# Patient Record
Sex: FEMALE | Race: WHITE | Employment: PART TIME | URBAN - METROPOLITAN AREA
[De-identification: names, ages, dates, MRNs, and addresses within clinical notes are randomized per-mention and may not be internally consistent; named-entity substitution may affect disease eponyms.]

---

## 2024-04-03 ENCOUNTER — APPOINTMENT (OUTPATIENT)
Dept: CT IMAGING | Facility: HOSPITAL | Age: 64
End: 2024-04-03
Attending: EMERGENCY MEDICINE
Payer: COMMERCIAL

## 2024-04-03 ENCOUNTER — HOSPITAL ENCOUNTER (EMERGENCY)
Facility: HOSPITAL | Age: 64
Discharge: HOME | End: 2024-04-03
Attending: EMERGENCY MEDICINE
Payer: COMMERCIAL

## 2024-04-03 VITALS
SYSTOLIC BLOOD PRESSURE: 152 MMHG | TEMPERATURE: 98 F | OXYGEN SATURATION: 94 % | HEIGHT: 59 IN | DIASTOLIC BLOOD PRESSURE: 85 MMHG | RESPIRATION RATE: 18 BRPM | WEIGHT: 127 LBS | BODY MASS INDEX: 25.6 KG/M2 | HEART RATE: 93 BPM

## 2024-04-03 DIAGNOSIS — K11.20 PAROTITIS: Primary | ICD-10-CM

## 2024-04-03 LAB — S PYO AG THROAT QL: NEGATIVE

## 2024-04-03 PROCEDURE — 99284 EMERGENCY DEPT VISIT MOD MDM: CPT

## 2024-04-03 PROCEDURE — 70490 CT SOFT TISSUE NECK W/O DYE: CPT | Performed by: EMERGENCY MEDICINE

## 2024-04-03 PROCEDURE — 87880 STREP A ASSAY W/OPTIC: CPT

## 2024-04-04 NOTE — ED INITIAL ASSESSMENT (HPI)
Pt c/o sore throat x 1.5 months. Pt reports taking ampicillin and it hasn't helped, and I am still having pain.     Pt had labs done and showed mumps antibody read high at 0.94. Pt scheduled to have a US soft tissue neck for 4/9/24

## 2024-04-04 NOTE — ED QUICK NOTES
Patient safe to DC home per MD. Able to dress self. DC teaching done, instructions reviewed with patient including when and how to follow up with healthcare providers and when to seek emergency care. The patient verbalizes understanding. Patient ambulatory with steady gait to exit.

## 2024-04-04 NOTE — ED PROVIDER NOTES
Patient Seen in: [facility] Emergency Department      History     Chief Complaint   Patient presents with    Sore Throat    Abnormal Labs     Stated Complaint: sore throat    Subjective:   HPI    64-year-old female with a doctor elsewhere with about a month of ongoing throat and bilateral jaw pain with recent outpatient labs showing elevated IgM levels for mom's here with ongoing pain.  She is due for an outpatient ultrasound next week.  No voice change.  No fever.  No weight loss.  She is here with her .  Relatively healthy otherwise.  Sugars been running a little high recently she says.    Objective:   Past Medical History:   Diagnosis Date    Diabetes (HCC)     Essential hypertension               History reviewed. No pertinent surgical history.             Social History     Socioeconomic History    Marital status:    Tobacco Use    Smoking status: Never    Smokeless tobacco: Never   Vaping Use    Vaping Use: Never used   Substance and Sexual Activity    Alcohol use: Never    Drug use: Never              Review of Systems    Positive for stated complaint: sore throat  Other systems are as noted in HPI.  Constitutional and vital signs reviewed.      All other systems reviewed and negative except as noted above.    Physical Exam     ED Triage Vitals [04/03/24 1951]   BP (!) 161/90   Pulse 94   Resp 18   Temp 97.9 °F (36.6 °C)   Temp src Temporal   SpO2 96 %   O2 Device None (Room air)       Current:/85   Pulse 93   Temp 97.9 °F (36.6 °C) (Temporal)   Resp 18   Ht 149.9 cm (4' 11\")   Wt 57.6 kg   SpO2 94%   BMI 25.65 kg/m²         Physical Exam    Constitutional: Oriented to person, place, and time.  Appears well-developed. No distress.   Head: Normocephalic and atraumatic.   Eyes: Conjunctivae are normal. Pupils are equal, round, and reactive to light.   ENT: Intraoral and posterior pharyngeal structures are grossly unremarkable.  There is swelling and mild pain to palpation  without fluctuance of both parotid areas.  Neck: Normal range of motion. Neck supple.  As above.  No significant lymphadenopathy.  No evidence of submental swelling Nabil angina or stridor.  Cardiovascular: Normal rate, regular rhythm and intact distal pulses.    Pulmonary/Chest: Effort normal. No respiratory distress.   Abdominal: Soft. There is no tenderness. There is no guarding.   Musculoskeletal: Normal range of motion. No edema or tenderness.   Neurological: Alert and oriented to person, place, and time.   Skin: Skin is warm and dry.   Psychiatric: Normal mood and affect.  Behavior is normal.   Nursing note and vitals reviewed.    Differential diagnosis includes parotitis and mumps, less likely abscess.      ED Course     Labs Reviewed   POCT RAPID STREP - Normal             CT SOFT TISSUE OF NECK (CPT=70490)    Result Date: 4/3/2024  PROCEDURE: CT SOFT TISSUE OF NECK (CPT=70490)  COMPARISON: None.  INDICATIONS: Throat pain, odynophagia.  TECHNIQUE:   CT images were obtained without non-ionic intravenous contrast material.  Automated exposure control for dose reduction was used. Adjustment of the mA and/or kV was done based on the patient's size. Use of iterative reconstruction technique for dose reduction was used.  Dose information is transmitted to the ACR (American College of Radiology) NRDR (National Radiology Data Registry) which includes the Dose Index Registry.   FINDINGS:  UPPER AERODIGESTIVE TRACT: Nasopharynx and oropharynx are normal.  Mild symmetric prominence of palatine tonsils with chronic punctate tonsilliths.  Mild nodular soft tissue prominence at the tongue base likely related to lingual tonsil tissue hypertrophy.  Epiglottis, glottis and subglottic airway normal. No suspect mass in the upper aerodigestive tract. PAROTID GLANDS:   Normal unenhanced parotid glands.  SUBMANDIBULAR GLANDS: Normal unenhanced submandibular glands.  THYROID GLAND:  Normal unenhanced thyroid gland.  LYMPH  NODES: Multiple subcentimeter lymph nodes in the neck bilaterally.  No lymph node meeting size criteria for enlargement. SINONASAL: No evidence for sinusitis. No nasal passage mass. MASTOIDS: No evidence for mastoiditis. ORBITS: Limited views normal. BRAIN: No suspect mass in visualized portion of brain.. VASCULAR: Mild atherosclerotic vascular calcification. BONES: No suspect bone lesion or fracture.  Degenerative changes in the spine. OTHER: Negative.              CONCLUSION:  1. No abscess or other acute finding. 2. Mild symmetric enlargement of palatine tonsils. 3. Mild nodular soft tissue prominence at tongue base likely related to lingual tonsils.  If persistent symptoms, correlation with direct visualization recommended for confirmation.    Dictated by (CST): Anibal Mackay MD on 4/03/2024 at 9:37 PM     Finalized by (CST): Anibal Mackay MD on 4/03/2024 at 9:43 PM                  MDM                                         Medical Decision Making  Patient clinic looks very well.  She is in the subacute phase.  Will give her some liquid pain medicine for now.  Steroids would not be good although might be helpful given her elevated sugar readings.  She has a follow-up with her doctor plan.  I encouraged her to call tomorrow.  She will take medications as prescribed.  She can do over-the-counter Tylenol Motrin as well.  She will come back with any worsening or change.    Problems Addressed:  Parotitis: acute illness or injury    Amount and/or Complexity of Data Reviewed  Radiology: ordered and independent interpretation performed. Decision-making details documented in ED Course.     Details: By my gross review the soft tissue neck CT there is no gross evidence of obvious fluid collection or airway compromise    Risk  OTC drugs.  Prescription drug management.  Decision regarding hospitalization.        Disposition and Plan     Clinical Impression:  1. Parotitis         Disposition:  Discharge  4/3/2024  9:55  pm    Follow-up:  YOUR PRIMARY CARE DOCTOR    Call            Medications Prescribed:  Discharge Medication List as of 4/3/2024 10:02 PM        START taking these medications    Details   HYDROcodone-acetaminophen 7.5-325 MG/15ML Oral Solution Take 10 mL by mouth every 6 (six) hours as needed for Pain., Normal, Disp-120 mL, R-0

## 2024-07-20 ENCOUNTER — APPOINTMENT (OUTPATIENT)
Dept: GENERAL RADIOLOGY | Facility: HOSPITAL | Age: 64
End: 2024-07-20
Attending: EMERGENCY MEDICINE
Payer: COMMERCIAL

## 2024-07-20 ENCOUNTER — HOSPITAL ENCOUNTER (EMERGENCY)
Facility: HOSPITAL | Age: 64
Discharge: HOME | End: 2024-07-20
Attending: EMERGENCY MEDICINE
Payer: COMMERCIAL

## 2024-07-20 VITALS
HEART RATE: 62 BPM | BODY MASS INDEX: 24.19 KG/M2 | WEIGHT: 120 LBS | SYSTOLIC BLOOD PRESSURE: 139 MMHG | RESPIRATION RATE: 16 BRPM | OXYGEN SATURATION: 98 % | DIASTOLIC BLOOD PRESSURE: 64 MMHG | HEIGHT: 59 IN | TEMPERATURE: 98 F

## 2024-07-20 DIAGNOSIS — M53.3 SACRAL PAIN: Primary | ICD-10-CM

## 2024-07-20 LAB — GLUCOSE BLDC GLUCOMTR-MCNC: 77 MG/DL (ref 70–99)

## 2024-07-20 PROCEDURE — 99284 EMERGENCY DEPT VISIT MOD MDM: CPT

## 2024-07-20 PROCEDURE — 72220 X-RAY EXAM SACRUM TAILBONE: CPT | Performed by: EMERGENCY MEDICINE

## 2024-07-20 PROCEDURE — 82962 GLUCOSE BLOOD TEST: CPT

## 2024-07-20 PROCEDURE — 74018 RADEX ABDOMEN 1 VIEW: CPT | Performed by: EMERGENCY MEDICINE

## 2024-07-20 RX ORDER — HYDROCODONE BITARTRATE AND ACETAMINOPHEN 5; 325 MG/1; MG/1
1-2 TABLET ORAL EVERY 6 HOURS PRN
Qty: 14 TABLET | Refills: 0 | Status: SHIPPED | OUTPATIENT
Start: 2024-07-20 | End: 2024-07-25

## 2024-07-20 RX ORDER — HYDROCODONE BITARTRATE AND ACETAMINOPHEN 5; 325 MG/1; MG/1
1 TABLET ORAL ONCE
Status: COMPLETED | OUTPATIENT
Start: 2024-07-20 | End: 2024-07-20

## 2024-07-20 NOTE — ED PROVIDER NOTES
Patient Seen in: [facility] Emergency Department    History     Chief Complaint   Patient presents with    Back Pain       HPI    84-year-old female presents ER with complaint of sacral pain for the past week as well as constipation for the past 5 days.  Patient was seen at [facility] had x-rays of her knee and lumbar spine showed no acute fracture.  Patient was discharged home with a lidocaine patch as well as Tylenol but states has not been helping.  Patient is tearful in the ER.  Patient denies any injury or fall    History reviewed.   Past Medical History:    Diabetes (HCC)    Essential hypertension       History reviewed. History reviewed. No pertinent surgical history.      Medications :  (Not in a hospital admission)       History reviewed. No pertinent family history.    Smoking Status:   Social History     Socioeconomic History    Marital status:    Tobacco Use    Smoking status: Never    Smokeless tobacco: Never   Vaping Use    Vaping status: Never Used   Substance and Sexual Activity    Alcohol use: Never    Drug use: Never       ROS  All pertinent positives for the review of systems are mentioned in the HPI  All other organ systems are reviewed and are negative.    Constitutional and vital signs reviewed.      Social History and Family History elements reviewed from today, pertinent positives to the presenting problem noted.    Physical Exam     ED Triage Vitals [07/20/24 1713]   /67   Pulse 77   Resp 18   Temp 98.4 °F (36.9 °C)   Temp src Oral   SpO2 99 %   O2 Device None (Room air)       All measures to prevent infection transmission during my interaction with the patient were taken. The patient was already wearing a droplet mask on my arrival to the room. Personal protective equipment including droplet mask, eye protection, and gloves were worn throughout the duration of the exam.  Handwashing was performed prior to and after the exam.  Stethoscope and any equipment used  during my examination was cleaned with super sani-cloth germicidal wipes following the exam.     Physical Exam  Vitals and nursing note reviewed.   Constitutional:       Appearance: She is well-developed.   HENT:      Head: Normocephalic and atraumatic.      Right Ear: External ear normal.      Left Ear: External ear normal.      Nose: Nose normal.   Eyes:      Conjunctiva/sclera: Conjunctivae normal.      Pupils: Pupils are equal, round, and reactive to light.   Cardiovascular:      Rate and Rhythm: Normal rate and regular rhythm.      Heart sounds: Normal heart sounds.   Pulmonary:      Effort: Pulmonary effort is normal.      Breath sounds: Normal breath sounds.   Abdominal:      General: Bowel sounds are normal.      Palpations: Abdomen is soft.   Musculoskeletal:         General: Normal range of motion.      Cervical back: Normal range of motion and neck supple.        Legs:       Comments: Tenderness to the circled area of the sacrum.     Skin:     General: Skin is warm and dry.   Neurological:      Mental Status: She is alert and oriented to person, place, and time.      Deep Tendon Reflexes: Reflexes are normal and symmetric.   Psychiatric:         Behavior: Behavior normal.         Thought Content: Thought content normal.         Judgment: Judgment normal.         ED Course      Labs Reviewed - No data to display      Imaging Results Available and Reviewed while in ED: XR SACRUM + COCCYX (MIN 2 VIEWS) (CPT=72220)    Result Date: 7/20/2024  CONCLUSION:  1. Grossly unremarkable radiographic appearance of the sacrum and coccyx. 2. Mild sacroiliac joint osteoarthritis bilaterally.    Dictated by (CST): Mark Hutchinson MD on 7/20/2024 at 6:50 PM     Finalized by (CST): Mark Hutchinson MD on 7/20/2024 at 6:51 PM          XR ABDOMEN (1 VIEW) (CPT=74018)    Result Date: 7/20/2024  CONCLUSION:  1. Nonobstructive bowel gas pattern.  No pathologic intra-abdominal calcifications. 2. Status post cholecystectomy.     Dictated by (CST): Mark Hutchinson MD on 7/20/2024 at 6:49 PM     Finalized by (CST): Mark Hutchinson MD on 7/20/2024 at 6:50 PM         ED Medications Administered:   Medications   HYDROcodone-acetaminophen (Norco) 5-325 MG per tab 1 tablet (1 tablet Oral Given 7/20/24 1744)         MDM     Vitals:    07/20/24 1713   BP: 119/67   Pulse: 77   Resp: 18   Temp: 98.4 °F (36.9 °C)   TempSrc: Oral   SpO2: 99%   Weight: 54.4 kg   Height: 149.9 cm (4' 11\")     *I personally reviewed and interpreted all ED vitals.  I also personally reviewed all labs and imaging if ordered    Pulse Ox: 99%, Room air, Normal     Monitor Interpretation:   normal sinus rhythm    Differential Diagnosis/ Diagnostic Considerations: Sacral pain, sacral fracture, sacral arthritis, constipation.    Medical Record Review: I personally reviewed available prior medical records for any recent pertinent discharge summaries, testing, and procedures and reviewed those reports.    Complicating Factors: The patient already has does not have any pertinent problems on file. to contribute to the complexity of this ED evaluation.    Medical Decision Making  64-year-old female presents ER with complaint of sacral pain.  Patient sacral x-ray shows no acute fracture or osteoarthritis.  Patient also is concerned about constipation but KUB showed normal bowels.  Patient given Norco for the pain states her pain resolved.  Patient instructed to follow-up with Dr. Contreras if her symptoms do not improve with the [place].  Patient's son bedside made aware of the discharge planning disposition.    Problems Addressed:  Sacral pain: acute illness or injury    Amount and/or Complexity of Data Reviewed  Independent Historian:      Details: Medical history obtained from the family member bedside states she been taking Lidoderm patches as well as Tylenol without any relief  External Data Reviewed: labs and notes.     Details: Patient notes reviewed.  Patient was seen at  [facility] and had lumbar x-ray as well as tib-fib and knee which showed no acute fracture or dislocation or subluxation  Labs: ordered. Decision-making details documented in ED Course.  Radiology: ordered and independent interpretation performed. Decision-making details documented in ED Course.     Details: Sacral x-ray reviewed by myself.  Radiology read shows no acute fracture or subluxation    Risk  Prescription drug management.        Condition upon leaving the department: Stable    Disposition and Plan     Clinical Impression:  1. Sacral pain        Disposition:  Discharge    Follow-up:  Cheikh Contreras MD  [address] [place] [id number]  [phone]    Schedule an appointment as soon as possible for a visit  If symptoms worsen      Medications Prescribed:  Current Discharge Medication List        START taking these medications    Details   HYDROcodone-acetaminophen 5-325 MG Oral Tab Take 1-2 tablets by mouth every 6 (six) hours as needed.  Qty: 14 tablet, Refills: 0    Associated Diagnoses: Sacral pain

## 2024-07-20 NOTE — ED INITIAL ASSESSMENT (HPI)
Pt came in for lower back pain x 1 week.  Pt was seen at [facility] yesterday was given Toradol shot and Lidocaine patch, per pt it is not helping.  Pt denies recent trauma.  Pt is A/Ox  4, breathing unlabored.